# Patient Record
Sex: FEMALE | Race: WHITE | NOT HISPANIC OR LATINO | Employment: OTHER | URBAN - METROPOLITAN AREA
[De-identification: names, ages, dates, MRNs, and addresses within clinical notes are randomized per-mention and may not be internally consistent; named-entity substitution may affect disease eponyms.]

---

## 2019-06-26 RX ORDER — CHLORPHENIRAMINE MALEATE 4 MG/1
4 TABLET ORAL
COMMUNITY

## 2019-06-26 RX ORDER — SIMVASTATIN 40 MG
40 TABLET ORAL
COMMUNITY

## 2019-06-26 RX ORDER — FLUTICASONE FUROATE AND VILANTEROL 200; 25 UG/1; UG/1
1 POWDER RESPIRATORY (INHALATION) DAILY
COMMUNITY
End: 2021-04-19 | Stop reason: ALTCHOICE

## 2019-06-26 RX ORDER — FLUTICASONE PROPIONATE 50 MCG
1 SPRAY, SUSPENSION (ML) NASAL DAILY
COMMUNITY

## 2019-06-26 RX ORDER — IRBESARTAN 150 MG/1
150 TABLET ORAL
COMMUNITY
End: 2021-04-19 | Stop reason: ALTCHOICE

## 2019-07-01 ENCOUNTER — ANESTHESIA EVENT (OUTPATIENT)
Dept: GASTROENTEROLOGY | Facility: HOSPITAL | Age: 69
End: 2019-07-01

## 2019-07-02 ENCOUNTER — ANESTHESIA (OUTPATIENT)
Dept: GASTROENTEROLOGY | Facility: HOSPITAL | Age: 69
End: 2019-07-02

## 2019-07-02 ENCOUNTER — HOSPITAL ENCOUNTER (OUTPATIENT)
Dept: GASTROENTEROLOGY | Facility: HOSPITAL | Age: 69
Setting detail: OUTPATIENT SURGERY
Discharge: HOME | End: 2019-07-02
Attending: INTERNAL MEDICINE | Admitting: INTERNAL MEDICINE
Payer: MEDICARE

## 2019-07-02 VITALS
TEMPERATURE: 98.9 F | OXYGEN SATURATION: 95 % | BODY MASS INDEX: 37.92 KG/M2 | HEART RATE: 84 BPM | DIASTOLIC BLOOD PRESSURE: 73 MMHG | SYSTOLIC BLOOD PRESSURE: 160 MMHG | WEIGHT: 256 LBS | HEIGHT: 69 IN | RESPIRATION RATE: 24 BRPM

## 2019-07-02 DIAGNOSIS — R05.9 COUGH: ICD-10-CM

## 2019-07-02 DIAGNOSIS — R10.12 LEFT UPPER QUADRANT PAIN: ICD-10-CM

## 2019-07-02 DIAGNOSIS — R10.11 RIGHT UPPER QUADRANT PAIN: ICD-10-CM

## 2019-07-02 DIAGNOSIS — R49.0 DYSPHONIA: ICD-10-CM

## 2019-07-02 PROCEDURE — 88305 TISSUE EXAM BY PATHOLOGIST: CPT | Performed by: PATHOLOGY

## 2019-07-02 PROCEDURE — 88342 IMHCHEM/IMCYTCHM 1ST ANTB: CPT | Performed by: PATHOLOGY

## 2019-07-02 PROCEDURE — 88312 SPECIAL STAINS GROUP 1: CPT | Performed by: PATHOLOGY

## 2019-07-02 PROCEDURE — 88341 IMHCHEM/IMCYTCHM EA ADD ANTB: CPT | Performed by: PATHOLOGY

## 2019-07-02 RX ORDER — PROPOFOL 10 MG/ML
INJECTION, EMULSION INTRAVENOUS AS NEEDED
Status: DISCONTINUED | OUTPATIENT
Start: 2019-07-02 | End: 2019-07-02 | Stop reason: SURG

## 2019-07-02 RX ORDER — SODIUM CHLORIDE 9 MG/ML
125 INJECTION, SOLUTION INTRAVENOUS CONTINUOUS
Status: DISCONTINUED | OUTPATIENT
Start: 2019-07-02 | End: 2019-07-06 | Stop reason: HOSPADM

## 2019-07-02 RX ADMIN — PROPOFOL 30 MG: 10 INJECTION, EMULSION INTRAVENOUS at 13:55

## 2019-07-02 RX ADMIN — PROPOFOL 50 MG: 10 INJECTION, EMULSION INTRAVENOUS at 14:01

## 2019-07-02 RX ADMIN — PROPOFOL 170 MG: 10 INJECTION, EMULSION INTRAVENOUS at 13:53

## 2019-07-02 RX ADMIN — PROPOFOL 50 MG: 10 INJECTION, EMULSION INTRAVENOUS at 13:58

## 2019-07-02 RX ADMIN — SODIUM CHLORIDE: 0.9 INJECTION, SOLUTION INTRAVENOUS at 13:40

## 2019-07-02 RX ADMIN — LIDOCAINE HYDROCHLORIDE 100 MG: 20 INJECTION, SOLUTION INTRAVENOUS at 13:53

## 2019-07-02 NOTE — DISCHARGE INSTRUCTIONS
Esophagitis   WHAT YOU NEED TO KNOW:   What is esophagitis? Esophagitis is inflammation or irritation of the lining of the esophagus  What causes esophagitis? The most common cause is acid reflux  This means stomach acid backs up into your esophagus  The following can also cause esophagitis:  · An infection from bacteria, a virus, or a fungus    · Vomiting or a hiatal hernia    · Medicines such as aspirin or NSAIDs    · Large pills taken without enough water or right before you go to bed    · Cancer treatment, such as radiation    · A toxic object you swallowed, such as a button battery, that gets stuck in your esophagus    · Too much caffeine or acidic or spicy foods    · Cigarette smoking  What are the signs and symptoms of esophagitis? Signs and symptoms depend on the cause of your esophagitis  You may have any of the following:  · Pain in the middle of your chest that may spread to your back    · Burning or pain in your esophagus, abdominal pain, or indigestion    · Trouble swallowing, or pain when you swallow    · A feeling that something is stuck in your esophagus    · Sore throat, a cough, or hoarseness    · Gagging, drooling, or wheezing    · Mouth sores (white patches), or a bad taste in your mouth or bad breath    · Nausea or vomiting    · Feeding problems or failure to thrive (young children)  How is esophagitis diagnosed? Your healthcare provider will ask about your symptoms and when they started  Tell him if anything makes your symptoms worse or better  You may need any of the following:  · An endoscopy  is a procedure used to look at your esophagus and stomach  Your healthcare provider will use an endoscope (tube with a camera and light on the end)  He may also take a tissue sample during the procedure  The sample may show if your esophagus was damaged by what is causing your esophagitis  · A barium swallow  is done to show if your esophagus was damaged and how badly it was damaged  X-rays are taken after you swallow barium liquid  Barium liquid is used to help damage show up on the x-ray  How is esophagitis treated? The goal of treatment is to help the lining of your esophagus heal and to prevent serious complications  Treatment will depend on what is causing your esophagitis  Symptoms caused by a toxic object such as a button battery need immediate treatment  Less severe causes may not need treatment  You may need any of the following if symptoms continue or get worse:  · Medicines  may be given to fight infection or to control stomach acid  Your healthcare provider may make changes to your medicines, such as changing it to a liquid form  · An elimination diet  may help you find foods that are causing your symptoms  You will stop eating certain foods that can cause esophagitis  Your healthcare provider will tell you to start eating them again one at a time  Each time you do not have symptoms, you will start eating another food from the list  Any food that does cause symptoms may be causing your esophagitis  · Surgery  may be needed if other treatments do not work  Part of your stomach can be wrapped to cover the valve between your stomach and esophagus  This helps prevent acid from backing up into your esophagus  What can I do to manage or prevent esophagitis? · Do not smoke  Nicotine and other chemicals in cigarettes and cigars can cause blood vessel and lung damage  Ask your healthcare provider for information if you currently smoke and need help to quit  E-cigarettes or smokeless tobacco still contain nicotine  Talk to your healthcare provider before you use these products  · Do not drink alcohol  Alcohol can irritate your esophagus  Talk to your healthcare provider if you need help to stop drinking  · Limit or do not eat foods that can lead to esophagitis  Foods such as oranges and salsa can irritate your esophagus  Caffeine and chocolate can cause acid reflux  High-fat and fried foods make your stomach digest food more slowly  This increases the amount of stomach acid your esophagus is exposed to  Eat small meals, and drink water with your meals  Soft foods such as yogurt and applesauce may help soothe your throat  Do not eat for at least 3 hours before you go to bed  · Keep batteries and similar objects out of the reach of children  Babies often put items in their mouths to explore them  Button batteries are easy to swallow and can cause serious damage  Keep the battery covers of electronic devices such as remote controls taped closed  Store all batteries and toxic materials where children cannot get to them  Use childproof locks to keep children away from dangerous materials  · Drink more liquid when you take pills  Drink a full glass of water when you take your pills  Ask your healthcare provider if you can take your pills at least an hour before you go to bed  · Prevent acid reflux  Do not bend over unless it is necessary  Acid may back up into your esophagus when you bend over  If possible, keep the head of your bed elevated while you sleep  This will help keep acid from backing up  Manage stress  Stress can make your symptoms worse or cause stomach acid to back up  Call 911 for any of the following:   · You have chest pain that does not go away within a few minutes or gets worse  When should I seek immediate care? · You feel like you have food stuck in your throat and you cannot cough it out  When should I contact my healthcare provider? · You have new or worsening symptoms, even after treatment  · You have questions or concerns about your condition or care  CARE AGREEMENT:   You have the right to help plan your care  Learn about your health condition and how it may be treated  Discuss treatment options with your caregivers to decide what care you want to receive  You always have the right to refuse treatment   The above information is an  only  It is not intended as medical advice for individual conditions or treatments  Talk to your doctor, nurse or pharmacist before following any medical regimen to see if it is safe and effective for you  © 2017 [address] Information is for End User's use only and may not be sold, redistributed or otherwise used for commercial purposes  All illustrations and images included in CareNotes® are the copyrighted property of A D [employer] M [employer] Inc  or [employer]

## 2019-07-02 NOTE — ANESTHESIA PREPROCEDURE EVALUATION
Review of Systems/Medical History  Patient summary reviewed  Chart reviewed      Cardiovascular  Hypertension controlled,    Pulmonary  Asthma , well controlled/ stable Asthma type of rescue: inhaled steroids,        GI/Hepatic  Negative GI/hepatic ROS     Comment: Persistent cough          Endo/Other    Obesity    GYN  Negative gynecology ROS          Hematology  Negative hematology ROS      Musculoskeletal  Negative musculoskeletal ROS        Neurology  Negative neurology ROS      Psychology   Negative psychology ROS              Physical Exam    Airway    Mallampati score: II  TM Distance: >3 FB  Neck ROM: full     Dental   No notable dental hx     Cardiovascular  Rhythm: regular, Rate: normal, Cardiovascular exam normal    Pulmonary  Pulmonary exam normal Breath sounds clear to auscultation,     Other Findings        Anesthesia Plan  ASA Score- 2     Anesthesia Type- IV sedation with anesthesia with ASA Monitors  Additional Monitors:   Airway Plan:         Plan Factors-Patient not instructed to abstain from smoking on day of procedure  Patient did not smoke on day of surgery  Induction- intravenous  Postoperative Plan-     Informed Consent- Anesthetic plan and risks discussed with patient  I personally reviewed this patient with the CRNA  Discussed and agreed on the Anesthesia Plan with the CRNA  Heriberto Segundo

## 2019-07-02 NOTE — ANESTHESIA POSTPROCEDURE EVALUATION
Post-Op Assessment Note    CV Status:  Stable    Pain management: adequate     Mental Status:  Alert and awake   Hydration Status:  Euvolemic   PONV Controlled:  Controlled   Airway Patency:  Patent   Post Op Vitals Reviewed: Yes      Staff: AnesthesiologistBEN           /73 (07/02/19 1430)    Temp      Pulse 84 (07/02/19 1430)   Resp (!) 24 (07/02/19 1430)    SpO2 95 % (07/02/19 1430) Statement Selected

## 2019-10-11 ENCOUNTER — OFFICE VISIT (OUTPATIENT)
Dept: URGENT CARE | Facility: CLINIC | Age: 69
End: 2019-10-11
Payer: MEDICARE

## 2019-10-11 VITALS
OXYGEN SATURATION: 95 % | SYSTOLIC BLOOD PRESSURE: 130 MMHG | TEMPERATURE: 97.8 F | HEIGHT: 69 IN | HEART RATE: 107 BPM | BODY MASS INDEX: 37.65 KG/M2 | DIASTOLIC BLOOD PRESSURE: 62 MMHG | RESPIRATION RATE: 18 BRPM | WEIGHT: 254.2 LBS

## 2019-10-11 DIAGNOSIS — J20.9 ACUTE BRONCHITIS, UNSPECIFIED ORGANISM: ICD-10-CM

## 2019-10-11 DIAGNOSIS — R05.9 COUGH: Primary | ICD-10-CM

## 2019-10-11 PROCEDURE — 94640 AIRWAY INHALATION TREATMENT: CPT | Performed by: PHYSICIAN ASSISTANT

## 2019-10-11 PROCEDURE — G0463 HOSPITAL OUTPT CLINIC VISIT: HCPCS | Performed by: PHYSICIAN ASSISTANT

## 2019-10-11 PROCEDURE — 99214 OFFICE O/P EST MOD 30 MIN: CPT | Performed by: PHYSICIAN ASSISTANT

## 2019-10-11 RX ORDER — ALBUTEROL SULFATE 2.5 MG/3ML
2.5 SOLUTION RESPIRATORY (INHALATION) ONCE
Status: COMPLETED | OUTPATIENT
Start: 2019-10-11 | End: 2019-10-11

## 2019-10-11 RX ORDER — PREDNISONE 20 MG/1
20 TABLET ORAL 2 TIMES DAILY WITH MEALS
Qty: 10 TABLET | Refills: 0 | Status: SHIPPED | OUTPATIENT
Start: 2019-10-11 | End: 2019-10-16

## 2019-10-11 RX ORDER — FLUTICASONE FUROATE AND VILANTEROL 200; 25 UG/1; UG/1
1 POWDER RESPIRATORY (INHALATION) DAILY
Qty: 1 INHALER | Refills: 0 | Status: SHIPPED | OUTPATIENT
Start: 2019-10-11

## 2019-10-11 RX ADMIN — Medication 0.5 MG: at 15:50

## 2019-10-11 RX ADMIN — ALBUTEROL SULFATE 2.5 MG: 2.5 SOLUTION RESPIRATORY (INHALATION) at 15:50

## 2019-10-11 NOTE — PATIENT INSTRUCTIONS
Take prednisone as instructed  Use inhalers as instructed  Call primary care doctor and plan follow up visit for next week  If significant worsening of symptoms with shortness of breath, chest pain, significant weakness proceed to emergency room for further evaluation

## 2019-10-11 NOTE — PROGRESS NOTES
[facility]    NAME: Remy Chandler is a 78 y o  female  : 1950    MRN: [id number]  DATE: 2019  TIME: 4:19 PM    Assessment and Plan   Cough [R05]  1  Cough  fluticasone-vilanterol (BREO ELLIPTA) 200-25 MCG/INH inhaler    umeclidinium bromide (INCRUSE ELLIPTA) 62 5 mcg/inh AEPB inhaler   2  Acute bronchitis, unspecified organism  albuterol inhalation solution 2 5 mg    ipratropium (ATROVENT) 0 02 % inhalation solution 0 5 mg    Mini neb    predniSONE 20 mg tablet     Patient does not want any medications from our office but will get her over-the-counters at her pharmacy  Nurse administered the nebulizer treatment with generic Atrovent and albuterol nebulizer solution  We discussed continuing use of plain Mucinex and possibly using Robitussin DM or Delsym cough syrup at bedtime to help her rest   Patient Instructions   Patient Instructions   Take prednisone as instructed  Use inhalers as instructed  Call primary care doctor and plan follow up visit for next week  If significant worsening of symptoms with shortness of breath, chest pain, significant weakness proceed to emergency room for further evaluation  Chief Complaint     Chief Complaint   Patient presents with    Cough     Pt reports having airway issues for the past year  Pt was seen for asthma in January for treatments  Pt reports it has gotten worse over the past two weeks  Productive cough that is clear, at times it is yellow  Pt reports she sometimes vomits in the morning bringing up mucous  No pain upon arrival         History of Present Illness   Remy Chandler presents to the clinic c/o  60-year-old female with chronic cough that is worse the last couple weeks with associated white to occas  yellow  phlegm  Increased wheezing  Cough worse at night and during the day  Was on inhalers at one time but her family doctor to come off saying that her cough was only related to reflux   Cough seems acutely worse in the last couple weeks  She is having a hard time sleeping due to cough  Patient reports she has had chronic cough for quite some time  She has been evaluated by her primary care doctor, ENT, allergist, GI specialist   She has had an EGD that showed mild esophagitis  They thought her cough was related to reflux so she has been on a PPI  She also had been on inhalers for what they thought was also asthma  Her family doctor evidently took her off the inhalers thinking that her symptoms were not asthma related  She was started on  Amitriptyline at bedtime to try and help reduce cough  She was asked to titrated from 10 mg to 20 mg and finally 30 mg  She said when she got to the 30 mg dose she felt like her cough worsened  She also felt like her cough worsened when she was told to discontinue her inhalers around September 9th  She is currently using plain Mucinex  She is on a nasal steroid spray  She is also on routine chlorpheniramine  Review of Systems   Review of Systems   Constitutional: Positive for fatigue  Negative for activity change, appetite change, chills, diaphoresis and fever  HENT: Positive for postnasal drip and rhinorrhea  Negative for congestion, ear discharge, ear pain, sinus pressure, sinus pain, sneezing, sore throat and trouble swallowing  Eyes: Negative  Respiratory: Positive for cough, chest tightness and wheezing  Negative for choking, shortness of breath and stridor  Productive yellow / green w/o blood   Cardiovascular: Negative  Negative for chest pain, palpitations and leg swelling         Current Medications     Long-Term Medications   Medication Sig Dispense Refill    chlorpheniramine (CHLOR-TRIMETON) 4 MG tablet Take 4 mg by mouth daily at bedtime      fluticasone (FLONASE) 50 mcg/act nasal spray 1 spray into each nostril daily      irbesartan (AVAPRO) 150 mg tablet Take 150 mg by mouth daily at bedtime      simvastatin (ZOCOR) 40 mg tablet Take 40 mg by mouth daily at bedtime      fluticasone-vilanterol (BREO ELLIPTA) 200-25 MCG/INH inhaler Inhale 1 puff daily Rinse mouth after use   fluticasone-vilanterol (BREO ELLIPTA) 200-25 MCG/INH inhaler Inhale 1 puff daily Rinse mouth after use  1 Inhaler 0    umeclidinium bromide (INCRUSE ELLIPTA) 62 5 mcg/inh AEPB inhaler Inhale 1 puff daily      umeclidinium bromide (INCRUSE ELLIPTA) 62 5 mcg/inh AEPB inhaler Inhale 1 puff daily 1 Inhaler 0       Current Allergies     Allergies as of 10/11/2019 - Reviewed 10/11/2019   Allergen Reaction Noted    Niacin  2019    Penicillins  2019    Tequin [gatifloxacin]  2019          The following portions of the patient's history were reviewed and updated as appropriate: allergies, current medications, past family history, past medical history, past social history, past surgical history and problem list   Past Medical History:   Diagnosis Date    Abdominal pain     left and right upper quadrant    Asthma     Chronic cough     with hoarseness    Hypertension     Obesity      Past Surgical History:   Procedure Laterality Date    CATARACT EXTRACTION Bilateral      SECTION      FASCIOTOMY      plantar    FOOT SURGERY      bunionectomy    FRACTURE SURGERY      ankle, plate inserted    HYSTERECTOMY       History reviewed  No pertinent family history  Objective   /62   Pulse (!) 107   Temp 97 8 °F (36 6 °C)   Resp 18   Ht 5' 9" (1 753 m)   Wt 115 kg (254 lb 3 2 oz)   SpO2 95%   BMI 37 54 kg/m²   No LMP recorded  Patient has had a hysterectomy  Physical Exam     Physical Exam   Constitutional: She is oriented to person, place, and time  She appears well-developed and well-nourished  No distress  Pleasant  Talkative  Accompanied by spouse  Occasional wet cough  Patient does show provider clear to white phlegm that she coughed into Kleenex  HENT:   Head: Normocephalic and atraumatic     Right Ear: External ear normal    Left Ear: External ear normal    Nose: Nose normal    Mouth/Throat: Oropharynx is clear and moist  No oropharyngeal exudate  Eyes: Pupils are equal, round, and reactive to light  Conjunctivae and EOM are normal  Right eye exhibits no discharge  Left eye exhibits no discharge  Neck: No tracheal deviation present  Cardiovascular: Normal rate, regular rhythm and normal heart sounds  Exam reveals no gallop and no friction rub  No murmur heard  Pulmonary/Chest: Effort normal  No stridor  No respiratory distress  She has wheezes  She has no rales  And expiratory wheezes with bronchial breath sounds throughout  Cough with deep breath  Scattered rhonchi  No rales  After nebulizer treatment:   Improved  Less cough with deep breath  Still some expiratory wheezes and sonorous rhonchi but improved  Musculoskeletal: She exhibits no edema  Lymphadenopathy:     She has no cervical adenopathy  Neurological: She is alert and oriented to person, place, and time  Skin: Skin is warm and dry  She is not diaphoretic  Psychiatric: She has a normal mood and affect  Nursing note and vitals reviewed

## 2019-10-22 DIAGNOSIS — R05.9 COUGH: ICD-10-CM

## 2019-10-31 ENCOUNTER — TRANSCRIBE ORDERS (OUTPATIENT)
Dept: ADMINISTRATIVE | Facility: HOSPITAL | Age: 69
End: 2019-10-31

## 2019-10-31 DIAGNOSIS — R05.9 COUGHING: Primary | ICD-10-CM

## 2019-11-05 DIAGNOSIS — R05.9 COUGH: ICD-10-CM

## 2019-11-05 RX ORDER — UMECLIDINIUM 62.5 UG/1
AEROSOL, POWDER ORAL
Refills: 0 | OUTPATIENT
Start: 2019-11-05

## 2021-04-08 DIAGNOSIS — Z23 ENCOUNTER FOR IMMUNIZATION: ICD-10-CM

## 2021-08-02 ENCOUNTER — OFFICE VISIT (OUTPATIENT)
Dept: URGENT CARE | Facility: CLINIC | Age: 71
End: 2021-08-02
Payer: MEDICARE

## 2021-08-02 VITALS
RESPIRATION RATE: 20 BRPM | SYSTOLIC BLOOD PRESSURE: 130 MMHG | DIASTOLIC BLOOD PRESSURE: 76 MMHG | OXYGEN SATURATION: 96 % | HEART RATE: 104 BPM | TEMPERATURE: 98 F

## 2021-08-02 DIAGNOSIS — S61.002A AVULSION OF SKIN OF THUMB, LEFT, INITIAL ENCOUNTER: Primary | ICD-10-CM

## 2021-08-02 PROCEDURE — G0463 HOSPITAL OUTPT CLINIC VISIT: HCPCS | Performed by: NURSE PRACTITIONER

## 2021-08-02 PROCEDURE — 99213 OFFICE O/P EST LOW 20 MIN: CPT | Performed by: NURSE PRACTITIONER

## 2021-08-02 NOTE — PATIENT INSTRUCTIONS
Keep the area clean and dry  Change dressing daily   Tetanus shot up to date  Dressing applied in the office today, tolerated well      Skin Avulsion   WHAT YOU NEED TO KNOW:   Skin avulsion is a wound that happens when skin is torn from your body during an accident or other injury  The torn skin may be lost or too damaged to be repaired, and it must be removed  A wound of this type cannot be stitched closed because there is tissue missing  Avulsion wounds are usually bigger and have more scars because of the missing tissue  DISCHARGE INSTRUCTIONS:   Medicines:   · Antibiotic ointment:  Your healthcare provider may tell you to gently rub a topical antibiotic ointment on your wound  This will help prevent an infection and help your wound heal faster  · Pain medicine: You may be given medicine to take away or decrease pain  Do not wait until the pain is severe before you take your medicine  · NSAIDs , such as ibuprofen, help decrease swelling, pain, and fever  This medicine is available with or without a doctor's order  NSAIDs can cause stomach bleeding or kidney problems in certain people  If you take blood thinner medicine, always ask if NSAIDs are safe for you  Always read the medicine label and follow directions  Do not give these medicines to children under 10months of age without direction from your child's healthcare provider  · Take your medicine as directed  Contact your healthcare provider if you think your medicine is not helping or if you have side effects  Tell him of her if you are allergic to any medicine  Keep a list of the medicines, vitamins, and herbs you take  Include the amounts, and when and why you take them  Bring the list or the pill bottles to follow-up visits  Carry your medicine list with you in case of an emergency  Care for your wound:  Avulsion wounds may take longer to heal because they cannot be closed with tape or stitches   Keep your wound clean and protected to prevent infection and speed healing  · Clean your wound:  Wash your hands with soap and water before and after you care for your wound  You may be able to use a soft cloth to gently clean the wound after the first 24 to 48 hours  After that, gently clean the wound once or twice a day with cool water  Do not soak your wound  Use soap to clean around the wound, but try not to get any on the wound itself  Do not use alcohol or hydrogen peroxide to clean your wound unless you are directed to  Gently pat the area dry and reapply the bandage as directed  · Elevate your wound:  Prop your injured area on pillows to raise it above the level of your heart  This will help reduce pain and swelling  Do this for 30 minutes at a time, as often as you can  · Bandage your wound:  Bandages keep your wound clean, dry, and protected from infection  They may also prevent swelling  Use a bandage that does not stick to your wound, and has a spongy layer to absorb fluids  Leave your bandage on as long as directed  Ask your healthcare provider when and how to change your bandage  Do not wrap the bandage too tightly  This could cut off blood flow and cause more injury  · Use cool compresses:  Wet a washcloth or towel with cool water and hold it on your wound as directed  Ask how often to apply the compress and for how long each time  · Reduce scarring:  Avoid direct sunlight on your wound  Sunlight may burn or change the color of the new skin over your wound  Use sunscreen (SPF 30 or higher) on the new skin for at least 1 year after it heals  Support for leg and arm wounds: You may need to use crutches if the wound is on your leg  You may need to use a sling if the wound is on your arm  Crutches and slings help protect the injured area, prevent further injury, and heal the area in the right position  Follow up with your healthcare provider within 2 days or as directed:   If you have stitches, ask when to return to have them removed  Write down your questions so you remember to ask them during your visits  Contact your healthcare provider if:   · You have new pain, or it gets worse  · You have trouble moving the injured body area  · Your wound splits open or does not seem to be healing  Return to the emergency department if:   · You have a fever  · You have painful swelling, redness, or warmth around your wound  · Your wound is red and there are red streaks on your skin starting at your wound and moving upward  · Your wound is draining pus  · You have heavy bleeding or bleeding that does not stop after 10 minutes of holding firm, direct pressure over the wound  · You feel like there is an object stuck in your wound  © Copyright [address] 2021 Information is for End User's use only and may not be sold, redistributed or otherwise used for commercial purposes  All illustrations and images included in CareNotes® are the copyrighted property of [employer] M [employer] Inc  or [facility] Mayela Cervantes   The above information is an  only  It is not intended as medical advice for individual conditions or treatments  Talk to your doctor, nurse or pharmacist before following any medical regimen to see if it is safe and effective for you

## 2021-08-02 NOTE — PROGRESS NOTES
NAME: Perla Powers is a 66 y o  female  : 1950    MRN: [id number]    /76   Pulse 104   Temp 98 °F (36 7 °C) (Tympanic)   Resp 20   SpO2 96%     Assessment and Plan   Avulsion of skin of thumb, left, initial encounter [[id number]]  1  Avulsion of skin of thumb, left, initial encounter         Dale Willett was seen today for laceration  Diagnoses and all orders for this visit:    Avulsion of skin of thumb, left, initial encounter        Patient Instructions   Patient Instructions   Keep the area clean and dry  Change dressing daily   Tetanus shot up to date  Dressing applied in the office today, tolerated well      Skin Avulsion   WHAT YOU NEED TO KNOW:   Skin avulsion is a wound that happens when skin is torn from your body during an accident or other injury  The torn skin may be lost or too damaged to be repaired, and it must be removed  A wound of this type cannot be stitched closed because there is tissue missing  Avulsion wounds are usually bigger and have more scars because of the missing tissue  DISCHARGE INSTRUCTIONS:   Medicines:   · Antibiotic ointment:  Your healthcare provider may tell you to gently rub a topical antibiotic ointment on your wound  This will help prevent an infection and help your wound heal faster  · Pain medicine: You may be given medicine to take away or decrease pain  Do not wait until the pain is severe before you take your medicine  · NSAIDs , such as ibuprofen, help decrease swelling, pain, and fever  This medicine is available with or without a doctor's order  NSAIDs can cause stomach bleeding or kidney problems in certain people  If you take blood thinner medicine, always ask if NSAIDs are safe for you  Always read the medicine label and follow directions  Do not give these medicines to children under 10months of age without direction from your child's healthcare provider  · Take your medicine as directed    Contact your healthcare provider if you think your medicine is not helping or if you have side effects  Tell him of her if you are allergic to any medicine  Keep a list of the medicines, vitamins, and herbs you take  Include the amounts, and when and why you take them  Bring the list or the pill bottles to follow-up visits  Carry your medicine list with you in case of an emergency  Care for your wound:  Avulsion wounds may take longer to heal because they cannot be closed with tape or stitches  Keep your wound clean and protected to prevent infection and speed healing  · Clean your wound:  Wash your hands with soap and water before and after you care for your wound  You may be able to use a soft cloth to gently clean the wound after the first 24 to 48 hours  After that, gently clean the wound once or twice a day with cool water  Do not soak your wound  Use soap to clean around the wound, but try not to get any on the wound itself  Do not use alcohol or hydrogen peroxide to clean your wound unless you are directed to  Gently pat the area dry and reapply the bandage as directed  · Elevate your wound:  Prop your injured area on pillows to raise it above the level of your heart  This will help reduce pain and swelling  Do this for 30 minutes at a time, as often as you can  · Bandage your wound:  Bandages keep your wound clean, dry, and protected from infection  They may also prevent swelling  Use a bandage that does not stick to your wound, and has a spongy layer to absorb fluids  Leave your bandage on as long as directed  Ask your healthcare provider when and how to change your bandage  Do not wrap the bandage too tightly  This could cut off blood flow and cause more injury  · Use cool compresses:  Wet a washcloth or towel with cool water and hold it on your wound as directed  Ask how often to apply the compress and for how long each time  · Reduce scarring:  Avoid direct sunlight on your wound   Sunlight may burn or change the color of the new skin over your wound  Use sunscreen (SPF 30 or higher) on the new skin for at least 1 year after it heals  Support for leg and arm wounds: You may need to use crutches if the wound is on your leg  You may need to use a sling if the wound is on your arm  Crutches and slings help protect the injured area, prevent further injury, and heal the area in the right position  Follow up with your healthcare provider within 2 days or as directed: If you have stitches, ask when to return to have them removed  Write down your questions so you remember to ask them during your visits  Contact your healthcare provider if:   · You have new pain, or it gets worse  · You have trouble moving the injured body area  · Your wound splits open or does not seem to be healing  Return to the emergency department if:   · You have a fever  · You have painful swelling, redness, or warmth around your wound  · Your wound is red and there are red streaks on your skin starting at your wound and moving upward  · Your wound is draining pus  · You have heavy bleeding or bleeding that does not stop after 10 minutes of holding firm, direct pressure over the wound  · You feel like there is an object stuck in your wound  © Copyright [address] 2021 Information is for End User's use only and may not be sold, redistributed or otherwise used for commercial purposes  All illustrations and images included in CareNotes® are the copyrighted property of A D [employer] M [employer] Inc  or [facility] Mayela Cervantes   The above information is an  only  It is not intended as medical advice for individual conditions or treatments  Talk to your doctor, nurse or pharmacist before following any medical regimen to see if it is safe and effective for you  Proceed to the nearest ER if symptoms worsen, Follow up with your PCP  Continue to social distance, wash your hands, and wear your masks  Please continue to follow the CDC  gov guidelines daily for they are subject to change on COVID-19    Chief Complaint     Chief Complaint   Patient presents with    Laceration     Patient cut right hand left thumb with a knife cutting potatoes         History of Present Illness     67 yo female here today with a left thumb injury, avulsion noted and has a skin flap that reattached itself, no suture necessary  TETANUS is UTD per chart, 2016  She was cutting potatoes when she obtained an avulsion to the left thumb on the tip, not involving the nail  She has no significant pain and this occurred about one hr ago  Full ROM of the left thumb, currently not bleeding      Review of Systems   Review of Systems   Skin: Positive for wound (left thumb)  Current Medications       Current Outpatient Medications:     chlorpheniramine (CHLOR-TRIMETON) 4 MG tablet, Take 4 mg by mouth daily at bedtime, Disp: , Rfl:     famotidine (PEPCID) 20 mg tablet, TAKE 2 TABLETS BY MOUTH DAILY (Patient taking differently: 40 mg ), Disp: 180 tablet, Rfl: 3    Fexofenadine HCl (MUCINEX ALLERGY PO), Take by mouth, Disp: , Rfl:     fluticasone (FLONASE) 50 mcg/act nasal spray, 1 spray into each nostril daily, Disp: , Rfl:     omeprazole (PriLOSEC) 20 mg delayed release capsule, Take 1 capsule (20 mg total) by mouth 2 (two) times a day 30 minutes before breakfast and supper, Disp: 180 capsule, Rfl: 1    simvastatin (ZOCOR) 40 mg tablet, Take 40 mg by mouth daily at bedtime, Disp: , Rfl:     FAMOTIDINE PO, Take by mouth daily (Patient not taking: Reported on 8/2/2021), Disp: , Rfl:     fluticasone-vilanterol (BREO ELLIPTA) 200-25 MCG/INH inhaler, Inhale 1 puff daily Rinse mouth after use   (Patient not taking: Reported on 4/19/2021), Disp: 1 Inhaler, Rfl: 0    Naproxen Sodium (ALEVE PO), Take 1 Dose by mouth as needed (Patient not taking: Reported on 6/21/2021), Disp: , Rfl:     Current Allergies     Allergies as of 08/02/2021 - Reviewed 08/02/2021   Allergen Reaction Noted    Gatifloxacin Swelling 2016    Niacin Tachycardia 2015    Penicillins Rash 2016              Past Medical History:   Diagnosis Date    Abdominal pain     left and right upper quadrant    Asthma     Chronic cough     with hoarseness    Hypertension     Obesity        Past Surgical History:   Procedure Laterality Date    CATARACT EXTRACTION Bilateral      SECTION      EGD  2019    Esophagitis    FASCIOTOMY      plantar    FOOT SURGERY      bunionectomy    FRACTURE SURGERY      ankle, plate inserted    HYSTERECTOMY         Family History   Problem Relation Age of Onset    Lymphoma Mother         non-hodgkins    Breast cancer Sister     Throat cancer Brother     RONNIE disease Brother     Breast cancer Maternal Grandmother     Esophageal cancer Maternal Grandfather     Lung cancer Maternal Aunt         mets to stomach and female organs    Breast cancer Half-Sister         mets to brain and bone    Cancer Half-Brother         kidney         Medications have been verified  The following portions of the patient's history were reviewed and updated as appropriate: allergies, current medications, past family history, past medical history, past social history, past surgical history and problem list     Objective   /76   Pulse 104   Temp 98 °F (36 7 °C) (Tympanic)   Resp 20   SpO2 96%      Physical Exam     Physical Exam  Constitutional:       Appearance: Normal appearance  Musculoskeletal:        Hands:    Skin:     General: Skin is warm  Capillary Refill: Capillary refill takes less than 2 seconds  Findings: Signs of injury and wound present  Neurological:      General: No focal deficit present  Mental Status: She is alert and oriented to person, place, and time     Psychiatric:         Attention and Perception: Attention normal          Mood and Affect: Mood normal                Note: Portions of this record may have been created with voice recognition software  Occasional wrong word or "sound a like" substitutions may have occurred due to the inherent limitations of voice recognition software  Please read the chart carefully and recognize, using context, where substitutions have occurred  Karel ERIN Martinez